# Patient Record
Sex: FEMALE | Race: WHITE | Employment: UNEMPLOYED | URBAN - METROPOLITAN AREA
[De-identification: names, ages, dates, MRNs, and addresses within clinical notes are randomized per-mention and may not be internally consistent; named-entity substitution may affect disease eponyms.]

---

## 2017-06-30 ENCOUNTER — HOSPITAL ENCOUNTER (OUTPATIENT)
Dept: CT IMAGING | Age: 47
Discharge: HOME | End: 2017-06-30
Attending: FAMILY MEDICINE
Payer: COMMERCIAL

## 2017-06-30 ENCOUNTER — HOSPITAL ENCOUNTER (OUTPATIENT)
Dept: GENERAL RADIOLOGY | Age: 47
Discharge: HOME | End: 2017-06-30
Attending: PHYSICIAN ASSISTANT
Payer: COMMERCIAL

## 2017-06-30 ENCOUNTER — HOSPITAL ENCOUNTER (EMERGENCY)
Age: 47
Discharge: HOME | End: 2017-06-30
Attending: EMERGENCY MEDICINE
Payer: COMMERCIAL

## 2017-06-30 VITALS
HEART RATE: 94 BPM | RESPIRATION RATE: 16 BRPM | DIASTOLIC BLOOD PRESSURE: 85 MMHG | SYSTOLIC BLOOD PRESSURE: 135 MMHG | TEMPERATURE: 98.1 F | OXYGEN SATURATION: 100 % | WEIGHT: 230 LBS | BODY MASS INDEX: 37.12 KG/M2

## 2017-06-30 DIAGNOSIS — R55 SYNCOPE: ICD-10-CM

## 2017-06-30 DIAGNOSIS — M25.561 ACUTE PAIN OF RIGHT KNEE: Primary | ICD-10-CM

## 2017-06-30 PROCEDURE — 99282 EMERGENCY DEPT VISIT SF MDM: CPT

## 2017-06-30 PROCEDURE — 73562 X-RAY EXAM OF KNEE 3: CPT

## 2017-06-30 PROCEDURE — 70450 CT HEAD/BRAIN W/O DYE: CPT

## 2017-06-30 RX ORDER — GABAPENTIN 300 MG/1
300 CAPSULE ORAL 3 TIMES DAILY
COMMUNITY

## 2017-06-30 RX ORDER — ALPRAZOLAM 0.5 MG/1
TABLET ORAL
COMMUNITY

## 2017-06-30 RX ORDER — DEXTROAMPHETAMINE SACCHARATE, AMPHETAMINE ASPARTATE, DEXTROAMPHETAMINE SULFATE AND AMPHETAMINE SULFATE 2.5; 2.5; 2.5; 2.5 MG/1; MG/1; MG/1; MG/1
10 TABLET ORAL
COMMUNITY

## 2017-06-30 RX ORDER — DULOXETIN HYDROCHLORIDE 20 MG/1
20 CAPSULE, DELAYED RELEASE ORAL DAILY
COMMUNITY

## 2017-06-30 NOTE — DISCHARGE INSTRUCTIONS
Knee Pain or Injury: Care Instructions  Your Care Instructions    Injuries are a common cause of knee problems. Sudden (acute) injuries may be caused by a direct blow to the knee. They can also be caused by abnormal twisting, bending, or falling on the knee. Pain, bruising, or swelling may be severe, and may start within minutes of the injury. Overuse is another cause of knee pain. Other causes are climbing stairs, kneeling, and other activities that use the knee. Everyday wear and tear, especially as you get older, also can cause knee pain. Rest, along with home treatment, often relieves pain and allows your knee to heal. If you have a serious knee injury, you may need tests and treatment. Follow-up care is a key part of your treatment and safety. Be sure to make and go to all appointments, and call your doctor if you are having problems. It's also a good idea to know your test results and keep a list of the medicines you take. How can you care for yourself at home? · Be safe with medicines. Read and follow all instructions on the label. ¨ If the doctor gave you a prescription medicine for pain, take it as prescribed. ¨ If you are not taking a prescription pain medicine, ask your doctor if you can take an over-the-counter medicine. · Rest and protect your knee. Take a break from any activity that may cause pain. · Put ice or a cold pack on your knee for 10 to 20 minutes at a time. Put a thin cloth between the ice and your skin. · Prop up a sore knee on a pillow when you ice it or anytime you sit or lie down for the next 3 days. Try to keep it above the level of your heart. This will help reduce swelling. · If your knee is not swollen, you can put moist heat, a heating pad, or a warm cloth on your knee. · If your doctor recommends an elastic bandage, sleeve, or other type of support for your knee, wear it as directed.   · Follow your doctor's instructions about how much weight you can put on your leg. Use a cane, crutches, or a walker as instructed. · Follow your doctor's instructions about activity during your healing process. If you can do mild exercise, slowly increase your activity. · Reach and stay at a healthy weight. Extra weight can strain the joints, especially the knees and hips, and make the pain worse. Losing even a few pounds may help. When should you call for help? Call 911 anytime you think you may need emergency care. For example, call if:  · You have symptoms of a blood clot in your lung (called a pulmonary embolism). These may include:  ¨ Sudden chest pain. ¨ Trouble breathing. ¨ Coughing up blood. Call your doctor now or seek immediate medical care if:  · You have severe or increasing pain. · Your leg or foot turns cold or changes color. · You cannot stand or put weight on your knee. · Your knee looks twisted or bent out of shape. · You cannot move your knee. · You have signs of infection, such as:  ¨ Increased pain, swelling, warmth, or redness. ¨ Red streaks leading from the knee. ¨ Pus draining from a place on your knee. ¨ A fever. · You have signs of a blood clot in your leg (called a deep vein thrombosis), such as:  ¨ Pain in your calf, back of the knee, thigh, or groin. ¨ Redness and swelling in your leg or groin. Watch closely for changes in your health, and be sure to contact your doctor if:  · You have tingling, weakness, or numbness in your knee. · You have any new symptoms, such as swelling. · You have bruises from a knee injury that last longer than 2 weeks. · You do not get better as expected. Where can you learn more? Go to http://unique-rebecca.info/. Enter K195 in the search box to learn more about \"Knee Pain or Injury: Care Instructions. \"  Current as of: March 20, 2017  Content Version: 11.3  © 1717-5869 [employer].  Care instructions adapted under license by [employer] (which disclaims liability or warranty for this information). If you have questions about a medical condition or this instruction, always ask your healthcare professional. [place] [address] any warranty or liability for your use of this information.

## 2017-06-30 NOTE — ED PROVIDER NOTES
HPI Comments: 40 yo F c/o right knee pain x 2 months. Pain worse with weightbearing. Has been using an ACE wrap with some improvement in pain. Denies injury to knee. No other complaints. Patient is a 41 y.o. female presenting with knee pain.    Knee Pain           Past Medical History:   Diagnosis Date    Achilles tendinitis     Arthritis     tendonitis feet    Arthritis     both hips    Bilateral hip pain     Blood in stool 2011    Bronchitis 2010    Chronic fatigue     Chronic low back pain     With disc space narrowing L2 through L5    Diarrhea     Dyspepsia and other specified disorders of function of stomach     Easy bruising     Eczema     GERD (gastroesophageal reflux disease)     HIATAL HERNIA    History of blood clots 2010    Hoarseness of voice     Hx of seasonal allergies     HX OTHER MEDICAL     neurapathy    Insomnia     Low back pain     from hip pain    Migraines     chronic    Nausea & vomiting     Pain of right heel     Right central heel pain at the calcaneus, reproducible    Plantar fasciitis     Chronic    Polyneuropathy     Rash     Right ankle pain     Right foot pain     Plantar medial pain    Ringing in ears     Shortness of breath     Sleep disorder     Stomachache     Stool color black     IBS    Tarsal tunnel syndrome of right side     Trochanteric bursitis of both hips     Unspecified adverse effect of anesthesia     \"difficult to wake up\"    Unspecified adverse effect of anesthesia     \"has woken up during a procedure (exploratory laparoscopy\"    Vitamin D deficiency     Wears contact lenses     Wears glasses        Past Surgical History:   Procedure Laterality Date    BREAST SURGERY PROCEDURE UNLISTED      lumpectomy x 2 left benign    BREAST SURGERY PROCEDURE UNLISTED      cyst drained from left breast    ENDOSCOPY, COLON, DIAGNOSTIC      upper and lower dilatation esophagus    HX GASTRIC BYPASS  3/26/2014    HX GYN      d and c and exp surg    HX ORTHOPAEDIC      repair injury left hand repair tendons rt foot    HX OTHER SURGICAL  5/23/2011    Open tarsal tunnel surgery         Family History:   Problem Relation Age of Onset    Diabetes Mother     Heart Disease Mother     Stroke Mother     Diabetes Brother     Hypertension Other     Arthritis-osteo Other     Ovarian Cancer Maternal Grandmother      19's    Cancer Paternal Grandfather      lung       Social History     Social History    Marital status:      Spouse name: N/A    Number of children: N/A    Years of education: N/A     Occupational History    Not on file. Social History Main Topics    Smoking status: Never Smoker    Smokeless tobacco: Never Used    Alcohol use No    Drug use: No    Sexual activity: Not on file     Other Topics Concern    Not on file     Social History Narrative         ALLERGIES: Gluten; Kiwi; and Milk    Review of Systems   Musculoskeletal: Positive for arthralgias and joint swelling. All other systems reviewed and are negative. Vitals:    06/30/17 1716   BP: 135/85   Pulse: 94   Resp: 16   Temp: 98.1 °F (36.7 °C)   SpO2: 100%   Weight: 104.3 kg (230 lb)            Physical Exam   Constitutional: She is oriented to person, place, and time. She appears well-developed and well-nourished. No distress. HENT:   Head: Normocephalic and atraumatic. Eyes: Conjunctivae are normal.   Neck: Normal range of motion. Neck supple. Cardiovascular: Normal rate, regular rhythm and normal heart sounds. Pulmonary/Chest: Effort normal and breath sounds normal. No respiratory distress. She has no wheezes. She has no rales. Musculoskeletal: Normal range of motion. Right knee diffusely TTP. No swelling or erythema noted. No calf swelling or tenderness. Neurological: She is alert and oriented to person, place, and time. Skin: Skin is warm and dry. Psychiatric: She has a normal mood and affect.  Her behavior is normal. Judgment and thought content normal.   Nursing note and vitals reviewed. MDM  Number of Diagnoses or Management Options  Acute pain of right knee:     ED Course       Procedures    -------------------------------------------------------------------------------------------------------------------     EKG INTERPRETATIONS:      RADIOLOGY RESULTS:   XR KNEE RT 3 V    (Results Pending)       LABORATORY RESULTS:  No results found for this or any previous visit (from the past 12 hour(s)). CONSULTATIONS:        PROGRESS NOTES:    6:18 PM PT well appearing and in NAD. X-ray unremarkable. Radiology read pending. Ortho f/u for further eval.     Lengthy D/W pt regarding possible worsening of pt's condition, need for follow up and strict ED return instructions for any worsening symptoms. DISPOSITION:  ED DIAGNOSIS & DISPOSITION INFORMATION  Diagnosis:   1. Acute pain of right knee          Disposition: home    Follow-up Information     Follow up With Details Comments Contact Info    Carrol Silveira MD Call on 7/3/2017 To make a follow up appointment [id number] [facility] [address]. [id number] [facility] EMERGENCY DEPT  Immediately if symptoms worsen [address] [facility]  [phone]          Patient's Medications   Start Taking    No medications on file   Continue Taking    ALPRAZOLAM (XANAX) 0.5 MG TABLET    Take  by mouth. CETIRIZINE (ZYRTEC) 10 MG TABLET    Take 10 mg by mouth nightly. Indications: ALLERGIC RHINITIS    DEXTROAMPHETAMINE-AMPHETAMINE (ADDERALL) 10 MG TABLET    Take 10 mg by mouth. DICYCLOMINE (BENTYL) 10 MG CAPSULE    Take 10 mg by mouth 4 times daily (before meals and nightly). DULOXETINE (CYMBALTA) 20 MG CAPSULE    Take 20 mg by mouth daily. ERGOCALCIFEROL (VITAMIN D2) 50,000 UNIT CAPSULE    Take 50,000 Units by mouth every seven (7) days. On sundays    GABAPENTIN (NEURONTIN) 300 MG CAPSULE    Take 300 mg by mouth three (3) times daily. HYOSCYAMINE SL (LEVSIN/SL) 0.125 MG SL TABLET    0.125 mg by SubLINGual route every four (4) hours as needed. OMEPRAZOLE (PRILOSEC) 10 MG CAPSULE    Take 10 mg by mouth daily. OXYCODONE HCL/ACETAMINOPHEN (PERCOCET PO)    Take  by mouth. TRAZODONE (DESYREL) 100 MG TABLET    Take 100 mg by mouth nightly. ZOLPIDEM (AMBIEN) 10 MG TABLET    Take 10 mg by mouth nightly.    These Medications have changed    No medications on file   Stop Taking    TRAMADOL (ULTRAM) 50 MG TABLET    Take 2 tabs PO TID prn pain

## 2017-06-30 NOTE — ED TRIAGE NOTES
C/O consistent pain to Rt knee with increased pain at times. Pain from Rt lower back that radiates down through leg today.

## 2017-07-05 ENCOUNTER — DOCUMENTATION ONLY (OUTPATIENT)
Dept: SURGERY | Age: 47
End: 2017-07-05

## 2017-07-05 NOTE — PROGRESS NOTES
Per [facility] requirements;  E-mail and letter sent for follow up appointment. Alexa Villagran Loss [address] [facility]  DR. RENEE[facility]S [facility]      Dear Yariel Marion,  Your health is our main concern. It is important for your health to have follow-up lab work and to see you surgeon at 3 months, 6 months and annually after your weight loss surgery. Additionally, the Department of bariatric Surgery at our hospital is a member of the [employer] [address] Surgical Quality Improvement Program ([facility] NSQIP). As a participant in this program, we gather information on the outcomes of our patients after surgery. Please call the office for a follow up appointment at [phone]. If you have moved out of the area or have changed surgeons please call us and let us know the name of your doctor. Your health and feedback are important to us. We greatly appreciate your response.        Thank you,  Alexa Villagran Loss [address] [facility]

## 2018-03-02 ENCOUNTER — DOCUMENTATION ONLY (OUTPATIENT)
Dept: SURGERY | Age: 48
End: 2018-03-02

## 2018-03-02 NOTE — PROGRESS NOTES
Per [facility] requirements;  E-mail and letter sent for follow up appointment. David Cosme Tyshawn Loss [address] [facility]  DR. [facility]      Dear Brayden Gonsales,  Your health is our main concern. It is important for your health to have follow-up lab work and to see you surgeon at 3 months, 6 months and annually after your weight loss surgery. Additionally, the Department of bariatric Surgery at our hospital is a member of the [employer] [address] Surgical Quality Improvement Program ([facility] NSQIP). As a participant in this program, we gather information on the outcomes of our patients after surgery. Please call the office for a follow up appointment at [phone] with [facility]. If you have moved out of the area or have changed surgeons please call us and let us know the name of your doctor. Your health and feedback are important to us. We greatly appreciate your response.        Thank you,  David Cosme Tyshawn Loss [address] [facility]